# Patient Record
Sex: FEMALE | Race: OTHER
[De-identification: names, ages, dates, MRNs, and addresses within clinical notes are randomized per-mention and may not be internally consistent; named-entity substitution may affect disease eponyms.]

---

## 2017-05-11 PROBLEM — Z00.00 ENCOUNTER FOR PREVENTIVE HEALTH EXAMINATION: Status: ACTIVE | Noted: 2017-05-11

## 2017-05-23 ENCOUNTER — APPOINTMENT (OUTPATIENT)
Dept: MATERNAL FETAL MEDICINE | Facility: CLINIC | Age: 32
End: 2017-05-23

## 2017-05-23 ENCOUNTER — APPOINTMENT (OUTPATIENT)
Dept: ANTEPARTUM | Facility: CLINIC | Age: 32
End: 2017-05-23

## 2017-05-23 DIAGNOSIS — F17.200 NICOTINE DEPENDENCE, UNSPECIFIED, UNCOMPLICATED: ICD-10-CM

## 2017-05-23 RX ORDER — VITAMIN C, CALCIUM, IRON, VITAMIN D3, VITAMIN E, VITAMIN B1, VITAMIN B2, VITAMIN B3, VITAMIN B6, FOLIC ACID, IODINE, ZINC, COPPER, DOCUSATE SODIUM, DOCOSAHEXAENOIC ACID (DHA) 27-1-50 MG
27-1 & 250 KIT ORAL
Refills: 0 | Status: ACTIVE | COMMUNITY

## 2017-07-19 ENCOUNTER — APPOINTMENT (OUTPATIENT)
Dept: ANTEPARTUM | Facility: CLINIC | Age: 32
End: 2017-07-19

## 2017-07-19 ENCOUNTER — APPOINTMENT (OUTPATIENT)
Dept: MATERNAL FETAL MEDICINE | Facility: CLINIC | Age: 32
End: 2017-07-19

## 2017-07-19 ENCOUNTER — ASOB RESULT (OUTPATIENT)
Age: 32
End: 2017-07-19

## 2017-07-19 VITALS
SYSTOLIC BLOOD PRESSURE: 108 MMHG | WEIGHT: 186 LBS | BODY MASS INDEX: 31.76 KG/M2 | DIASTOLIC BLOOD PRESSURE: 76 MMHG | HEIGHT: 64 IN

## 2017-07-19 DIAGNOSIS — O99.213 OBESITY COMPLICATING PREGNANCY, THIRD TRIMESTER: ICD-10-CM

## 2017-07-19 DIAGNOSIS — O99.333 SMOKING (TOBACCO) COMPLICATING PREGNANCY, THIRD TRIMESTER: ICD-10-CM

## 2017-08-16 ENCOUNTER — APPOINTMENT (OUTPATIENT)
Dept: ANTEPARTUM | Facility: CLINIC | Age: 32
End: 2017-08-16

## 2017-08-16 ENCOUNTER — APPOINTMENT (OUTPATIENT)
Dept: MATERNAL FETAL MEDICINE | Facility: CLINIC | Age: 32
End: 2017-08-16

## 2017-09-08 LAB
PERFORMED BY: NORMAL
ZIKA VIRUS PANEL RESULT: ABNORMAL

## 2017-09-11 ENCOUNTER — ASOB RESULT (OUTPATIENT)
Age: 32
End: 2017-09-11

## 2017-09-11 ENCOUNTER — APPOINTMENT (OUTPATIENT)
Dept: MATERNAL FETAL MEDICINE | Facility: CLINIC | Age: 32
End: 2017-09-11
Payer: MEDICAID

## 2017-09-11 ENCOUNTER — APPOINTMENT (OUTPATIENT)
Dept: ANTEPARTUM | Facility: CLINIC | Age: 32
End: 2017-09-11
Payer: MEDICAID

## 2017-09-11 VITALS
BODY MASS INDEX: 32.36 KG/M2 | WEIGHT: 189.56 LBS | HEIGHT: 64 IN | HEART RATE: 65 BPM | DIASTOLIC BLOOD PRESSURE: 62 MMHG | SYSTOLIC BLOOD PRESSURE: 120 MMHG

## 2017-09-11 DIAGNOSIS — O99.89 OTHER SPECIFIED DISEASES AND CONDITIONS COMPLICATING PREGNANCY, CHILDBIRTH AND THE PUERPERIUM: ICD-10-CM

## 2017-09-11 DIAGNOSIS — Z3A.35 35 WEEKS GESTATION OF PREGNANCY: ICD-10-CM

## 2017-09-11 DIAGNOSIS — O36.60X0 MATERNAL CARE FOR EXCESSIVE FETAL GROWTH, UNSPECIFIED TRIMESTER, NOT APPLICABLE OR UNSPECIFIED: ICD-10-CM

## 2017-09-11 DIAGNOSIS — Z20.828 OTHER SPECIFIED DISEASES AND CONDITIONS COMPLICATING PREGNANCY, CHILDBIRTH AND THE PUERPERIUM: ICD-10-CM

## 2017-09-11 PROCEDURE — 76819 FETAL BIOPHYS PROFIL W/O NST: CPT

## 2017-09-11 PROCEDURE — 99214 OFFICE O/P EST MOD 30 MIN: CPT

## 2017-09-11 PROCEDURE — 76805 OB US >/= 14 WKS SNGL FETUS: CPT

## 2017-09-18 ENCOUNTER — APPOINTMENT (OUTPATIENT)
Dept: ANTEPARTUM | Facility: CLINIC | Age: 32
End: 2017-09-18

## 2017-09-30 ENCOUNTER — TRANSCRIPTION ENCOUNTER (OUTPATIENT)
Age: 32
End: 2017-09-30

## 2017-09-30 ENCOUNTER — INPATIENT (INPATIENT)
Facility: HOSPITAL | Age: 32
LOS: 1 days | Discharge: HOME | End: 2017-10-02
Attending: OBSTETRICS & GYNECOLOGY | Admitting: OBSTETRICS & GYNECOLOGY
Payer: COMMERCIAL

## 2017-09-30 VITALS — WEIGHT: 189.6 LBS | HEIGHT: 64 IN

## 2017-09-30 DIAGNOSIS — O26.893 OTHER SPECIFIED PREGNANCY RELATED CONDITIONS, THIRD TRIMESTER: ICD-10-CM

## 2017-09-30 DIAGNOSIS — O47.1 FALSE LABOR AT OR AFTER 37 COMPLETED WEEKS OF GESTATION: ICD-10-CM

## 2017-09-30 LAB
ABO RH CONFIRMATION: SIGNIFICANT CHANGE UP
BASOPHILS # BLD AUTO: 0 K/UL — SIGNIFICANT CHANGE UP (ref 0–0.2)
BASOPHILS # BLD AUTO: 0 K/UL — SIGNIFICANT CHANGE UP (ref 0–0.2)
BASOPHILS NFR BLD AUTO: 0.1 % — SIGNIFICANT CHANGE UP (ref 0–2)
BASOPHILS NFR BLD AUTO: 0.2 % — SIGNIFICANT CHANGE UP (ref 0–2)
BLD GP AB SCN SERPL QL: SIGNIFICANT CHANGE UP
EOSINOPHIL # BLD AUTO: 0.1 K/UL — SIGNIFICANT CHANGE UP (ref 0–0.5)
EOSINOPHIL # BLD AUTO: 0.1 K/UL — SIGNIFICANT CHANGE UP (ref 0–0.5)
EOSINOPHIL NFR BLD AUTO: 0.7 % — SIGNIFICANT CHANGE UP (ref 0–6)
EOSINOPHIL NFR BLD AUTO: 1.1 % — SIGNIFICANT CHANGE UP (ref 0–6)
GAS PNL BLDCOV: 7.29 — SIGNIFICANT CHANGE UP (ref 7.11–7.36)
HCT VFR BLD CALC: 29.9 % — LOW (ref 37–47)
HCT VFR BLD CALC: 30.3 % — LOW (ref 37–47)
HGB BLD-MCNC: 10.2 G/DL — LOW (ref 12–16)
HGB BLD-MCNC: 10.5 G/DL — LOW (ref 12–16)
LYMPHOCYTES # BLD AUTO: 1.6 K/UL — SIGNIFICANT CHANGE UP (ref 1–4.8)
LYMPHOCYTES # BLD AUTO: 17.9 % — LOW (ref 20–55)
LYMPHOCYTES # BLD AUTO: 2 K/UL — SIGNIFICANT CHANGE UP (ref 1–4.8)
LYMPHOCYTES # BLD AUTO: 24.6 % — SIGNIFICANT CHANGE UP (ref 20–55)
MCHC RBC-ENTMCNC: 29.7 PG — SIGNIFICANT CHANGE UP (ref 27–31)
MCHC RBC-ENTMCNC: 29.9 PG — SIGNIFICANT CHANGE UP (ref 27–31)
MCHC RBC-ENTMCNC: 34.1 G/DL — SIGNIFICANT CHANGE UP (ref 32–36)
MCHC RBC-ENTMCNC: 34.7 G/DL — SIGNIFICANT CHANGE UP (ref 32–36)
MCV RBC AUTO: 86.3 FL — SIGNIFICANT CHANGE UP (ref 81–99)
MCV RBC AUTO: 86.9 FL — SIGNIFICANT CHANGE UP (ref 81–99)
MONOCYTES # BLD AUTO: 0.6 K/UL — SIGNIFICANT CHANGE UP (ref 0–0.8)
MONOCYTES # BLD AUTO: 0.7 K/UL — SIGNIFICANT CHANGE UP (ref 0–0.8)
MONOCYTES NFR BLD AUTO: 6.6 % — SIGNIFICANT CHANGE UP (ref 3–10)
MONOCYTES NFR BLD AUTO: 8.3 % — SIGNIFICANT CHANGE UP (ref 3–10)
NEUTROPHILS # BLD AUTO: 5.3 K/UL — SIGNIFICANT CHANGE UP (ref 1.8–8)
NEUTROPHILS # BLD AUTO: 6.7 K/UL — SIGNIFICANT CHANGE UP (ref 1.8–8)
NEUTROPHILS NFR BLD AUTO: 65.2 % — SIGNIFICANT CHANGE UP (ref 37–73)
NEUTROPHILS NFR BLD AUTO: 74 % — HIGH (ref 37–73)
PCO2 BLDCOA: 59.1 MMHG — SIGNIFICANT CHANGE UP (ref 32.2–65.8)
PCO2 BLDCOV: 40.2 MMHG — SIGNIFICANT CHANGE UP (ref 27–49.4)
PH BLDCOA: 7.19 — SIGNIFICANT CHANGE UP (ref 7.11–7.36)
PLATELET # BLD AUTO: 221 K/UL — SIGNIFICANT CHANGE UP (ref 150–400)
PLATELET # BLD AUTO: 228 K/UL — SIGNIFICANT CHANGE UP (ref 150–400)
PO2 BLDCOA: 16.8 MMHG — SIGNIFICANT CHANGE UP (ref 6–30)
PO2 BLDCOA: 28.7 MMHG — SIGNIFICANT CHANGE UP (ref 17.4–41)
RBC # BLD: 3.44 M/UL — LOW (ref 4.4–5.2)
RBC # BLD: 3.51 M/UL — LOW (ref 4.4–5.2)
RBC # FLD: 13.8 % — SIGNIFICANT CHANGE UP (ref 11–15.6)
RBC # FLD: 13.8 % — SIGNIFICANT CHANGE UP (ref 11–15.6)
RPR SERPL-ACNC: SIGNIFICANT CHANGE UP
SAO2 % BLDCOA: SIGNIFICANT CHANGE UP
SAO2 % BLDCOV: SIGNIFICANT CHANGE UP
TYPE + AB SCN PNL BLD: SIGNIFICANT CHANGE UP
WBC # BLD: 8.2 K/UL — SIGNIFICANT CHANGE UP (ref 4.8–10.8)
WBC # BLD: 9.1 K/UL — SIGNIFICANT CHANGE UP (ref 4.8–10.8)
WBC # FLD AUTO: 8.2 K/UL — SIGNIFICANT CHANGE UP (ref 4.8–10.8)
WBC # FLD AUTO: 9.1 K/UL — SIGNIFICANT CHANGE UP (ref 4.8–10.8)

## 2017-09-30 RX ORDER — SODIUM CHLORIDE 9 MG/ML
1000 INJECTION, SOLUTION INTRAVENOUS
Qty: 0 | Refills: 0 | Status: DISCONTINUED | OUTPATIENT
Start: 2017-09-30 | End: 2017-09-30

## 2017-09-30 RX ORDER — ACETAMINOPHEN 500 MG
650 TABLET ORAL EVERY 6 HOURS
Qty: 0 | Refills: 0 | Status: DISCONTINUED | OUTPATIENT
Start: 2017-09-30 | End: 2017-10-02

## 2017-09-30 RX ORDER — CITRIC ACID/SODIUM CITRATE 300-500 MG
30 SOLUTION, ORAL ORAL EVERY 4 HOURS
Qty: 0 | Refills: 0 | Status: DISCONTINUED | OUTPATIENT
Start: 2017-09-30 | End: 2017-09-30

## 2017-09-30 RX ORDER — MAGNESIUM HYDROXIDE 400 MG/1
30 TABLET, CHEWABLE ORAL
Qty: 0 | Refills: 0 | Status: DISCONTINUED | OUTPATIENT
Start: 2017-09-30 | End: 2017-10-02

## 2017-09-30 RX ORDER — SODIUM CHLORIDE 9 MG/ML
1000 INJECTION, SOLUTION INTRAVENOUS ONCE
Qty: 0 | Refills: 0 | Status: COMPLETED | OUTPATIENT
Start: 2017-09-30 | End: 2017-09-30

## 2017-09-30 RX ORDER — GLYCERIN ADULT
1 SUPPOSITORY, RECTAL RECTAL AT BEDTIME
Qty: 0 | Refills: 0 | Status: DISCONTINUED | OUTPATIENT
Start: 2017-09-30 | End: 2017-10-02

## 2017-09-30 RX ORDER — OXYCODONE AND ACETAMINOPHEN 5; 325 MG/1; MG/1
2 TABLET ORAL EVERY 6 HOURS
Qty: 0 | Refills: 0 | Status: DISCONTINUED | OUTPATIENT
Start: 2017-09-30 | End: 2017-10-02

## 2017-09-30 RX ORDER — TETANUS TOXOID, REDUCED DIPHTHERIA TOXOID AND ACELLULAR PERTUSSIS VACCINE, ADSORBED 5; 2.5; 8; 8; 2.5 [IU]/.5ML; [IU]/.5ML; UG/.5ML; UG/.5ML; UG/.5ML
0.5 SUSPENSION INTRAMUSCULAR ONCE
Qty: 0 | Refills: 0 | Status: COMPLETED | OUTPATIENT
Start: 2017-09-30

## 2017-09-30 RX ORDER — IBUPROFEN 200 MG
600 TABLET ORAL EVERY 6 HOURS
Qty: 0 | Refills: 0 | Status: DISCONTINUED | OUTPATIENT
Start: 2017-09-30 | End: 2017-10-02

## 2017-09-30 RX ORDER — PRAMOXINE HYDROCHLORIDE 150 MG/15G
1 AEROSOL, FOAM RECTAL EVERY 4 HOURS
Qty: 0 | Refills: 0 | Status: DISCONTINUED | OUTPATIENT
Start: 2017-09-30 | End: 2017-10-02

## 2017-09-30 RX ORDER — HYDROCORTISONE 1 %
1 OINTMENT (GRAM) TOPICAL EVERY 4 HOURS
Qty: 0 | Refills: 0 | Status: DISCONTINUED | OUTPATIENT
Start: 2017-09-30 | End: 2017-10-02

## 2017-09-30 RX ORDER — DIBUCAINE 1 %
1 OINTMENT (GRAM) RECTAL EVERY 4 HOURS
Qty: 0 | Refills: 0 | Status: DISCONTINUED | OUTPATIENT
Start: 2017-09-30 | End: 2017-10-02

## 2017-09-30 RX ORDER — DIPHENHYDRAMINE HCL 50 MG
25 CAPSULE ORAL EVERY 6 HOURS
Qty: 0 | Refills: 0 | Status: DISCONTINUED | OUTPATIENT
Start: 2017-09-30 | End: 2017-10-02

## 2017-09-30 RX ORDER — SIMETHICONE 80 MG/1
80 TABLET, CHEWABLE ORAL EVERY 6 HOURS
Qty: 0 | Refills: 0 | Status: DISCONTINUED | OUTPATIENT
Start: 2017-09-30 | End: 2017-10-02

## 2017-09-30 RX ORDER — AER TRAVELER 0.5 G/1
1 SOLUTION RECTAL; TOPICAL EVERY 4 HOURS
Qty: 0 | Refills: 0 | Status: DISCONTINUED | OUTPATIENT
Start: 2017-09-30 | End: 2017-10-02

## 2017-09-30 RX ORDER — OXYTOCIN 10 UNIT/ML
333.33 VIAL (ML) INJECTION
Qty: 20 | Refills: 0 | Status: DISCONTINUED | OUTPATIENT
Start: 2017-09-30 | End: 2017-10-02

## 2017-09-30 RX ORDER — DOCUSATE SODIUM 100 MG
100 CAPSULE ORAL
Qty: 0 | Refills: 0 | Status: DISCONTINUED | OUTPATIENT
Start: 2017-09-30 | End: 2017-10-02

## 2017-09-30 RX ORDER — OXYTOCIN 10 UNIT/ML
333.33 VIAL (ML) INJECTION
Qty: 20 | Refills: 0 | Status: COMPLETED | OUTPATIENT
Start: 2017-09-30

## 2017-09-30 RX ORDER — LANOLIN
1 OINTMENT (GRAM) TOPICAL EVERY 6 HOURS
Qty: 0 | Refills: 0 | Status: DISCONTINUED | OUTPATIENT
Start: 2017-09-30 | End: 2017-10-02

## 2017-09-30 RX ORDER — OXYTOCIN 10 UNIT/ML
41.67 VIAL (ML) INJECTION
Qty: 20 | Refills: 0 | Status: DISCONTINUED | OUTPATIENT
Start: 2017-09-30 | End: 2017-10-02

## 2017-09-30 RX ORDER — SODIUM CHLORIDE 9 MG/ML
3 INJECTION INTRAMUSCULAR; INTRAVENOUS; SUBCUTANEOUS EVERY 8 HOURS
Qty: 0 | Refills: 0 | Status: DISCONTINUED | OUTPATIENT
Start: 2017-09-30 | End: 2017-10-02

## 2017-09-30 RX ADMIN — SODIUM CHLORIDE 125 MILLILITER(S): 9 INJECTION, SOLUTION INTRAVENOUS at 02:30

## 2017-09-30 RX ADMIN — Medication 125 MILLIUNIT(S)/MIN: at 05:00

## 2017-09-30 RX ADMIN — Medication 600 MILLIGRAM(S): at 17:20

## 2017-09-30 RX ADMIN — Medication 1000 MILLIUNIT(S)/MIN: at 04:30

## 2017-09-30 RX ADMIN — SODIUM CHLORIDE 2000 MILLILITER(S): 9 INJECTION, SOLUTION INTRAVENOUS at 02:45

## 2017-09-30 RX ADMIN — Medication 600 MILLIGRAM(S): at 16:28

## 2017-09-30 NOTE — DISCHARGE NOTE OB - PLAN OF CARE
Reached Pelvic rest advised for 6 weeks. Take meds as prescribed. Please follow up with OB/GYN in 6 weeks at [facility] for post partum care.

## 2017-09-30 NOTE — DISCHARGE NOTE OB - MEDICATION SUMMARY - MEDICATIONS TO TAKE
I will START or STAY ON the medications listed below when I get home from the hospital:    ibuprofen 600 mg oral tablet  -- 1 tab(s) by mouth every 6 hours, As needed, Moderate Pain  -- Indication: For pain    ferrous sulfate 325 mg (65 mg elemental iron) oral tablet  -- 1 tab(s) by mouth once a day   -- Check with your doctor before becoming pregnant.  Do not chew, break, or crush.  May discolor urine or feces.    -- Indication: For anemia    docusate sodium 100 mg oral capsule  -- 1 cap(s) by mouth 2 times a day, As needed, Stool Softening  -- Indication: For constipation

## 2017-09-30 NOTE — DISCHARGE NOTE OB - ADDITIONAL INSTRUCTIONS
Pelvic rest advised for 6 weeks. Take meds as prescribed. Please follow up with OB/GYN in 6 weeks at [facility] for post partum care.

## 2017-09-30 NOTE — DISCHARGE NOTE OB - PATIENT PORTAL LINK FT
“You can access the Follow[facility] Patient Portal, offered by [facility], by registering with the following website: [email or web address]”

## 2017-09-30 NOTE — DISCHARGE NOTE OB - HOSPITAL COURSE
Pt is 31y/o  s/p  at 38 weeks and 4 days. Recommended early ambulation, adequate hydration and a balanced diet. Mother-baby interaction also encouraged and breast feeding. Recommend Pelvic rest × 6 weeks. Postpartum check in 6 weeks with OB/GYN at [facility] clinic.

## 2017-09-30 NOTE — DISCHARGE NOTE OB - CARE PROVIDER_API CALL
Berny Hutchinson), [facility] Obstetrics and Gynecology  [facility] [place] [address]  Phone: [phone]  Fax: [phone]

## 2017-09-30 NOTE — DISCHARGE NOTE OB - CARE PLAN
Principal Discharge DX:	 (normal spontaneous vaginal delivery)  Goal:	Reached  Instructions for follow-up, activity and diet:	Pelvic rest advised for 6 weeks. Take meds as prescribed. Please follow up with OB/GYN in 6 weeks at [facility] for post partum care.

## 2017-10-01 RX ADMIN — Medication 600 MILLIGRAM(S): at 21:00

## 2017-10-01 RX ADMIN — Medication 1 TABLET(S): at 11:50

## 2017-10-01 RX ADMIN — Medication 600 MILLIGRAM(S): at 20:04

## 2017-10-01 NOTE — PROGRESS NOTE ADULT - SUBJECTIVE AND OBJECTIVE BOX
Postpartum progress note.  32y years old.  status post spontaneous vaginal delivery at 38 4/7 weeks gestation. Postpartum day # 1    Patient seen and examined at bedside, no acute overnight events.    Subjective: Patient is ambulating, tolerating PO intake of regular diet, voiding, passing flatus, no bowel movements. No breast tenderness. Breast feeding. Pain is well controlled. She reports mild vaginal bleeding, has changed pads 2 times during the night, partially soaked. Patient denies headache, nausea/vomiting, shortness of breath, fever, chills or calf pain.     Objective:   Vital Signs: Vital Signs Last 24 Hrs  T(C): 36.8 (30 Sep 2017 21:21), Max: 37.1 (30 Sep 2017 11:24)  T(F): 98.2 (30 Sep 2017 21:21), Max: 98.8 (30 Sep 2017 11:24)  HR: 64 (30 Sep 2017 21:21) (51 - 64)  BP: 100/65 (30 Sep 2017 21:21) (100/65 - 122/54)  BP(mean): --  RR: 18 (30 Sep 2017 21:21) (18 - 18)  SpO2: 98% (30 Sep 2017 21:21) (98% - 98%)    Physical Examination:  General: No acute distress.  Lungs: Clear to auscultation bilaterally, no adventitious sounds.  Cardiovascular: Regular rate and rhythm, normal S1/S2, no murmurs.  Breast: no tenderness or engorgement, no abnormal discharge  Abdomen: Bowel sounds present, soft, non distended, minimally tender, fundus firm at 1 cm below umbilicus.  Pelvic: Minimal lochia rubra.  Extremities: No cyanosis or edema. No calf tenderness, (-) Sanjeev's sign.    Labs:                        10.2   9.1   )-----------( 221      ( 30 Sep 2017 18:53 )             29.9       Medication:  MEDICATIONS  (STANDING):  sodium chloride 0.9% lock flush 3 milliLiter(s) IV Push every 8 hours  diphtheria/tetanus/pertussis (acellular) Vaccine (ADAcel) 0.5 milliLiter(s) IntraMuscular once  oxytocin Infusion 41.667 milliUNIT(s)/Min (125 mL/Hr) IV Continuous <Continuous>  prenatal multivitamin 1 Tablet(s) Oral daily  oxytocin Infusion 333.333 milliUNIT(s)/Min (1000 mL/Hr) IV Continuous <Continuous>    MEDICATIONS  (PRN):  acetaminophen   Tablet. 650 milliGRAM(s) Oral every 6 hours PRN Mild Pain  acetaminophen   Tablet 650 milliGRAM(s) Oral every 6 hours PRN For Temp greater than 38.5 C (101.3 F)  ibuprofen  Tablet 600 milliGRAM(s) Oral every 6 hours PRN Moderate Pain  oxyCODONE    5 mG/acetaminophen 325 mG 2 Tablet(s) Oral every 6 hours PRN Severe Pain  hydrocortisone 1% Cream 1 Application(s) Topical every 4 hours PRN Moderate to Severe Perineal Pain  pramoxine 1%/zinc 5% Cream 1 Application(s) Topical every 4 hours PRN Moderate to Severe Perineal Pain  dibucaine 1% Ointment 1 Application(s) Topical every 4 hours PRN Perineal Discomfort  lanolin Ointment 1 Application(s) Topical every 6 hours PRN Sore Nipples  witch hazel Pads 1 Application(s) Topical every 4 hours PRN Perineal Discomfort  simethicone 80 milliGRAM(s) Chew every 6 hours PRN Gas  diphenhydrAMINE   Capsule 25 milliGRAM(s) Oral every 6 hours PRN Itching  glycerin Suppository - Adult 1 Suppository(s) Rectal at bedtime PRN Constipation  magnesium hydroxide Suspension 30 milliLiter(s) Oral two times a day PRN Constipation  docusate sodium 100 milliGRAM(s) Oral two times a day PRN Stool Softening

## 2017-10-01 NOTE — PROGRESS NOTE ADULT - ASSESSMENT
32y years old.  status post spontaneous vaginal delivery at 38 4/7 weeks gestation. Postpartum day # 1 without complications

## 2017-10-01 NOTE — PROGRESS NOTE ADULT - SUBJECTIVE AND OBJECTIVE BOX
INTERVAL HPI/OVERNIGHT EVENTS:  32y Female s/p labor epidural on 09/30/17    Vital Signs Last 24 Hrs  T(C): 36.8 (30 Sep 2017 21:21), Max: 36.8 (30 Sep 2017 21:21)  T(F): 98.2 (30 Sep 2017 21:21), Max: 98.2 (30 Sep 2017 21:21)  HR: 64 (30 Sep 2017 21:21) (64 - 64)  BP: 100/65 (30 Sep 2017 21:21) (100/65 - 100/65)  BP(mean): --  RR: 18 (30 Sep 2017 21:21) (18 - 18)  SpO2: 98% (30 Sep 2017 21:21) (98% - 98%)    Patient seen, doing well, no anesthetic complications or complaints noted or reported.

## 2017-10-02 VITALS
TEMPERATURE: 98 F | SYSTOLIC BLOOD PRESSURE: 123 MMHG | RESPIRATION RATE: 18 BRPM | HEART RATE: 57 BPM | DIASTOLIC BLOOD PRESSURE: 83 MMHG

## 2017-10-02 PROCEDURE — 86850 RBC ANTIBODY SCREEN: CPT

## 2017-10-02 PROCEDURE — G0463: CPT

## 2017-10-02 PROCEDURE — 90715 TDAP VACCINE 7 YRS/> IM: CPT

## 2017-10-02 PROCEDURE — 59050 FETAL MONITOR W/REPORT: CPT

## 2017-10-02 PROCEDURE — T1013: CPT

## 2017-10-02 PROCEDURE — 82803 BLOOD GASES ANY COMBINATION: CPT

## 2017-10-02 PROCEDURE — 90686 IIV4 VACC NO PRSV 0.5 ML IM: CPT

## 2017-10-02 PROCEDURE — 86901 BLOOD TYPING SEROLOGIC RH(D): CPT

## 2017-10-02 PROCEDURE — 86900 BLOOD TYPING SEROLOGIC ABO: CPT

## 2017-10-02 PROCEDURE — 85027 COMPLETE CBC AUTOMATED: CPT

## 2017-10-02 PROCEDURE — 86592 SYPHILIS TEST NON-TREP QUAL: CPT

## 2017-10-02 PROCEDURE — 36415 COLL VENOUS BLD VENIPUNCTURE: CPT

## 2017-10-02 RX ORDER — IBUPROFEN 200 MG
1 TABLET ORAL
Qty: 120 | Refills: 0 | OUTPATIENT
Start: 2017-10-02 | End: 2017-11-01

## 2017-10-02 RX ORDER — TETANUS TOXOID, REDUCED DIPHTHERIA TOXOID AND ACELLULAR PERTUSSIS VACCINE, ADSORBED 5; 2.5; 8; 8; 2.5 [IU]/.5ML; [IU]/.5ML; UG/.5ML; UG/.5ML; UG/.5ML
0.5 SUSPENSION INTRAMUSCULAR ONCE
Qty: 0 | Refills: 0 | Status: COMPLETED | OUTPATIENT
Start: 2017-10-02 | End: 2017-10-02

## 2017-10-02 RX ORDER — INFLUENZA VIRUS VACCINE 15; 15; 15; 15 UG/.5ML; UG/.5ML; UG/.5ML; UG/.5ML
0.5 SUSPENSION INTRAMUSCULAR ONCE
Qty: 0 | Refills: 0 | Status: COMPLETED | OUTPATIENT
Start: 2017-10-02 | End: 2017-10-02

## 2017-10-02 RX ORDER — FERROUS SULFATE 325(65) MG
1 TABLET ORAL
Qty: 30 | Refills: 0 | OUTPATIENT
Start: 2017-10-02 | End: 2017-11-01

## 2017-10-02 RX ORDER — DOCUSATE SODIUM 100 MG
1 CAPSULE ORAL
Qty: 60 | Refills: 0 | OUTPATIENT
Start: 2017-10-02 | End: 2017-11-01

## 2017-10-02 RX ADMIN — INFLUENZA VIRUS VACCINE 0.5 MILLILITER(S): 15; 15; 15; 15 SUSPENSION INTRAMUSCULAR at 06:24

## 2017-10-02 RX ADMIN — TETANUS TOXOID, REDUCED DIPHTHERIA TOXOID AND ACELLULAR PERTUSSIS VACCINE, ADSORBED 0.5 MILLILITER(S): 5; 2.5; 8; 8; 2.5 SUSPENSION INTRAMUSCULAR at 06:23

## 2017-10-02 NOTE — PROGRESS NOTE ADULT - SUBJECTIVE AND OBJECTIVE BOX
Postpartum progress note.  32y years old.  status post spontaneous vaginal delivery at 39 6/7 weeks gestation. Postpartum day # 2    Patient seen and examined at bedside, no acute overnight events.    Subjective: Patient is ambulating, tolerating PO intake of regular diet, voiding, passing flatus, no bowel movements. No breast tenderness. Breast feeding. Pain is well controlled. She reports mild vaginal bleeding. Patient denies headache, nausea/vomiting, shortness of breath, fever, chills or calf pain.     Objective:  Vital Signs Last 24 Hrs  T(C): 36.8 (01 Oct 2017 20:43), Max: 36.8 (01 Oct 2017 20:43)  T(F): 98.2 (01 Oct 2017 20:43), Max: 98.2 (01 Oct 2017 20:43)  HR: 55 (01 Oct 2017 20:43) (55 - 55)  BP: 117/65 (01 Oct 2017 20:43) (117/65 - 117/65)  BP(mean): --  RR: 18 (01 Oct 2017 20:43) (18 - 18)  SpO2: 100% (01 Oct 2017 20:43) (100% - 100%)     Physical Examination:  General: No acute distress.  Lungs: Clear to auscultation bilaterally, no adventitious sounds.  Cardiovascular: Regular rate and rhythm, normal S1/S2, no murmurs.  Breast: no tenderness or engorgement, no abnormal discharge  Abdomen: Bowel sounds present, soft, non distended, minimally tender, fundus firm at 1 cm below umbilicus. Pt report some pain around he umbilicus where she has the incision after BTL, incision looks clean , no erythema, no signs of infection .  Pelvic: Minimal lochia rubra.  Extremities: No cyanosis or edema. No calf tenderness, (-) Sanjeev's sign.    Labs:                        10.2   9.1   )-----------( 221      ( 30 Sep 2017 18:53 )             29.9       Medication:  MEDICATIONS  (STANDING):  sodium chloride 0.9% lock flush 3 milliLiter(s) IV Push every 8 hours  diphtheria/tetanus/pertussis (acellular) Vaccine (ADAcel) 0.5 milliLiter(s) IntraMuscular once  oxytocin Infusion 41.667 milliUNIT(s)/Min (125 mL/Hr) IV Continuous <Continuous>  prenatal multivitamin 1 Tablet(s) Oral daily  oxytocin Infusion 333.333 milliUNIT(s)/Min (1000 mL/Hr) IV Continuous <Continuous>    MEDICATIONS  (PRN):  acetaminophen   Tablet. 650 milliGRAM(s) Oral every 6 hours PRN Mild Pain  acetaminophen   Tablet 650 milliGRAM(s) Oral every 6 hours PRN For Temp greater than 38.5 C (101.3 F)  ibuprofen  Tablet 600 milliGRAM(s) Oral every 6 hours PRN Moderate Pain  oxyCODONE    5 mG/acetaminophen 325 mG 2 Tablet(s) Oral every 6 hours PRN Severe Pain  hydrocortisone 1% Cream 1 Application(s) Topical every 4 hours PRN Moderate to Severe Perineal Pain  pramoxine 1%/zinc 5% Cream 1 Application(s) Topical every 4 hours PRN Moderate to Severe Perineal Pain  dibucaine 1% Ointment 1 Application(s) Topical every 4 hours PRN Perineal Discomfort  lanolin Ointment 1 Application(s) Topical every 6 hours PRN Sore Nipples  witch hazel Pads 1 Application(s) Topical every 4 hours PRN Perineal Discomfort  simethicone 80 milliGRAM(s) Chew every 6 hours PRN Gas  diphenhydrAMINE   Capsule 25 milliGRAM(s) Oral every 6 hours PRN Itching  glycerin Suppository - Adult 1 Suppository(s) Rectal at bedtime PRN Constipation  magnesium hydroxide Suspension 30 milliLiter(s) Oral two times a day PRN Constipation  docusate sodium 100 milliGRAM(s) Oral two times a day PRN Stool Softening Postpartum progress note.  32y years old.  status post spontaneous vaginal delivery at 38.4 weeks gestation. Postpartum day # 2    Patient seen and examined at bedside, no acute overnight events.    Subjective: Patient is ambulating, tolerating PO intake of regular diet, voiding, passing flatus, no bowel movements. No breast tenderness. Breast feeding. Pain is well controlled. She reports mild vaginal bleeding. Patient denies headache, nausea/vomiting, shortness of breath, fever, chills or calf pain.     Objective:  Vital Signs Last 24 Hrs  T(C): 36.8 (01 Oct 2017 20:43), Max: 36.8 (01 Oct 2017 20:43)  T(F): 98.2 (01 Oct 2017 20:43), Max: 98.2 (01 Oct 2017 20:43)  HR: 55 (01 Oct 2017 20:43) (55 - 55)  BP: 117/65 (01 Oct 2017 20:43) (117/65 - 117/65)  BP(mean): --  RR: 18 (01 Oct 2017 20:43) (18 - 18)  SpO2: 100% (01 Oct 2017 20:43) (100% - 100%)     Physical Examination:  General: No acute distress.  Lungs: Clear to auscultation bilaterally, no adventitious sounds.  Cardiovascular: Regular rate and rhythm, normal S1/S2, no murmurs.  Breast: no tenderness or engorgement, no abnormal discharge  Abdomen: Bowel sounds present, soft, non distended, minimally tender, fundus firm at 1 cm below umbilicus. Pt report some pain around he umbilicus where she has the incision after BTL, incision looks clean , no erythema, no signs of infection .  Pelvic: Minimal lochia rubra.  Extremities: No cyanosis or edema. No calf tenderness, (-) Sanjeev's sign.    Labs:                        10.2   9.1   )-----------( 221      ( 30 Sep 2017 18:53 )             29.9       Medication:  MEDICATIONS  (STANDING):  sodium chloride 0.9% lock flush 3 milliLiter(s) IV Push every 8 hours  diphtheria/tetanus/pertussis (acellular) Vaccine (ADAcel) 0.5 milliLiter(s) IntraMuscular once  oxytocin Infusion 41.667 milliUNIT(s)/Min (125 mL/Hr) IV Continuous <Continuous>  prenatal multivitamin 1 Tablet(s) Oral daily  oxytocin Infusion 333.333 milliUNIT(s)/Min (1000 mL/Hr) IV Continuous <Continuous>    MEDICATIONS  (PRN):  acetaminophen   Tablet. 650 milliGRAM(s) Oral every 6 hours PRN Mild Pain  acetaminophen   Tablet 650 milliGRAM(s) Oral every 6 hours PRN For Temp greater than 38.5 C (101.3 F)  ibuprofen  Tablet 600 milliGRAM(s) Oral every 6 hours PRN Moderate Pain  oxyCODONE    5 mG/acetaminophen 325 mG 2 Tablet(s) Oral every 6 hours PRN Severe Pain  hydrocortisone 1% Cream 1 Application(s) Topical every 4 hours PRN Moderate to Severe Perineal Pain  pramoxine 1%/zinc 5% Cream 1 Application(s) Topical every 4 hours PRN Moderate to Severe Perineal Pain  dibucaine 1% Ointment 1 Application(s) Topical every 4 hours PRN Perineal Discomfort  lanolin Ointment 1 Application(s) Topical every 6 hours PRN Sore Nipples  witch hazel Pads 1 Application(s) Topical every 4 hours PRN Perineal Discomfort  simethicone 80 milliGRAM(s) Chew every 6 hours PRN Gas  diphenhydrAMINE   Capsule 25 milliGRAM(s) Oral every 6 hours PRN Itching  glycerin Suppository - Adult 1 Suppository(s) Rectal at bedtime PRN Constipation  magnesium hydroxide Suspension 30 milliLiter(s) Oral two times a day PRN Constipation  docusate sodium 100 milliGRAM(s) Oral two times a day PRN Stool Softening

## 2017-10-02 NOTE — PROGRESS NOTE ADULT - ATTENDING COMMENTS
patient with no complaints  patient declined tubal sterilization  cbc results reviewed  anticipate disccharge home tomorrow
patient is PPD#2  patient signed consent for tubal sterilization and declined during her hospital stay  intrapartum and postpartum care has been unremarkable   will follow up in 6 weeks

## 2017-10-02 NOTE — PROGRESS NOTE ADULT - ASSESSMENT
32y years old.  status post spontaneous vaginal delivery at 39.6 weeks gestation. Postpartum day # 2without complications and BTL s/p post day 1. 32y years old.  status post spontaneous vaginal delivery at 38.4 weeks gestation. Postpartum day # 2without complications and BTL s/p post day 1. 32y years old.  status post spontaneous vaginal delivery at 38.4 weeks gestation. Postpartum day # 2without complications

## 2017-10-10 DIAGNOSIS — Z3A.38 38 WEEKS GESTATION OF PREGNANCY: ICD-10-CM

## 2017-10-10 DIAGNOSIS — Z23 ENCOUNTER FOR IMMUNIZATION: ICD-10-CM

## 2022-01-03 NOTE — DISCHARGE NOTE OB - NS AS DC AMI YN
[facility] - Labor & Delivery  Obstetrics & Gynecology  Discharge Summary    Patient Name: Reva Simon  MRN: [id number]  Admission Date: (Not on file)  Hospital Length of Stay: 0 days  Discharge Date: 1/2/2022  Attending Physician: Lima Aviles MD   Discharging Provider: Lima Aviles MD  Primary Care Provider: Joseph Astudillo MD      Hospital Course: Pt is a 33 y/o [id number] who presented with heavy bleeding after being recently diagnosed non-viable pregnancy on 12/29/2021. She stated initially bleeding was light on Friday/Saturday but Sunday started with heavy bleeding, clots and had syncopal episode in ED. Decision made to proceed to OR for suction D&C. Pt tolerated procedure well and was discharge without pain, bleeding controlled, ambulating/voiding, tolerating po without issue. Pt given strict precautions.     Procedure(s) (LRB):  DILATION AND CURETTAGE, UTERUS (N/A)     Significant Diagnostic Studies: Labs:   CBC   Recent Labs   Lab 01/02/22  0319 01/02/22  0319 01/02/22  0820   WBC 15.01*  --  13.96*   HGB 12.7  --  10.9*   HCT 36.7*   < > 32.2*     --  247    < > = values in this interval not displayed.       Pending Diagnostic Studies:     None        There are no hospital problems to display for this patient.       Discharged Condition: good    Disposition: to home    Follow Up:  Call to make appointment on Monday for follow-up    Lima Aviles MD  Obstetrics & Gynecology  [facility] - Labor & Delivery   no

## 2023-01-04 NOTE — PATIENT PROFILE OB - NS PRO INDICAT FLU
Post-Care Instructions: I reviewed with the patient in detail post-care instructions. Patient is to wear sunprotection, and avoid picking at any of the treated lesions. Pt may apply Vaseline to crusted or scabbing areas. Number Of Freeze-Thaw Cycles: 3 freeze-thaw cycles Duration Of Freeze Thaw-Cycle (Seconds): 0 Detail Level: Detailed Show Applicator Variable?: Yes Consent: The patient's consent was obtained including but not limited to risks of crusting, scabbing, blistering, scarring, darker or lighter pigmentary change, recurrence, incomplete removal and infection. Render Post-Care Instructions In Note?: no Patient/surrogate requesting vaccine

## 2023-11-26 NOTE — PROGRESS NOTE ADULT - PROBLEM SELECTOR PLAN 1
Routine Post partum care  -encourage mother and baby interaction , hydratation , breast feeding and formula  plan for discharge on day #2 Initial (On Arrival)